# Patient Record
(demographics unavailable — no encounter records)

---

## 2024-10-14 NOTE — DISCUSSION/SUMMARY
[FreeTextEntry1] : Syncope Inclined towards a conservative follow up in this patient. We had a careful discussion regarding diet and exercise. Will be happy to re-evaluate. echo reviewed.  HTN Ez RAMIREZ  and I had an extensive discussion regarding her blood pressure management. Patient will continue taking current medications in addition to maintaining a low Na diet, with periodic b/p checks at home.

## 2024-10-14 NOTE — REASON FOR VISIT
[Symptom and Test Evaluation] : symptom and test evaluation [FreeTextEntry1] : Since last visited, she had a mechanical fall. No issues with norvasc but she is taking it intermittently at times Saw Dr Barker, labs pending.